# Patient Record
Sex: FEMALE | Race: WHITE | NOT HISPANIC OR LATINO | URBAN - METROPOLITAN AREA
[De-identification: names, ages, dates, MRNs, and addresses within clinical notes are randomized per-mention and may not be internally consistent; named-entity substitution may affect disease eponyms.]

---

## 2023-09-05 ENCOUNTER — OFFICE VISIT (OUTPATIENT)
Dept: URGENT CARE | Facility: CLINIC | Age: 38
End: 2023-09-05
Payer: COMMERCIAL

## 2023-09-05 VITALS
RESPIRATION RATE: 20 BRPM | SYSTOLIC BLOOD PRESSURE: 133 MMHG | HEART RATE: 120 BPM | OXYGEN SATURATION: 100 % | TEMPERATURE: 99 F | DIASTOLIC BLOOD PRESSURE: 87 MMHG

## 2023-09-05 DIAGNOSIS — J02.0 STREP PHARYNGITIS: Primary | ICD-10-CM

## 2023-09-05 PROCEDURE — 99213 OFFICE O/P EST LOW 20 MIN: CPT | Performed by: FAMILY MEDICINE

## 2023-09-05 RX ORDER — AMOXICILLIN 500 MG/1
500 CAPSULE ORAL EVERY 12 HOURS SCHEDULED
Qty: 20 CAPSULE | Refills: 0 | Status: SHIPPED | OUTPATIENT
Start: 2023-09-05 | End: 2023-09-15

## 2023-09-05 RX ORDER — ONDANSETRON 4 MG/1
4 TABLET, ORALLY DISINTEGRATING ORAL ONCE
Status: COMPLETED | OUTPATIENT
Start: 2023-09-05 | End: 2023-09-05

## 2023-09-05 RX ORDER — METHYLPREDNISOLONE 4 MG/1
TABLET ORAL
Qty: 21 TABLET | Refills: 0 | Status: SHIPPED | OUTPATIENT
Start: 2023-09-05

## 2023-09-05 RX ORDER — ONDANSETRON 4 MG/1
4 TABLET, FILM COATED ORAL EVERY 8 HOURS PRN
Qty: 20 TABLET | Refills: 0 | Status: SHIPPED | OUTPATIENT
Start: 2023-09-05

## 2023-09-05 RX ADMIN — ONDANSETRON 4 MG: 4 TABLET, ORALLY DISINTEGRATING ORAL at 09:00

## 2023-09-05 NOTE — PROGRESS NOTES
[facility]        NAME: Emil Paulson is a 45 y.o. female  : 1985    MRN: [id number]  DATE: 2023  TIME: 9:11 AM    Assessment and Plan   Strep pharyngitis [J02.0]  1. Strep pharyngitis  ondansetron (ZOFRAN-ODT) dispersible tablet 4 mg    amoxicillin (AMOXIL) 500 mg capsule    methylPREDNISolone 4 MG tablet therapy pack    ondansetron (ZOFRAN) 4 mg tablet            Patient Instructions       Follow up with PCP in 3-5 days. Proceed to  ER if symptoms worsen. Chief Complaint     Chief Complaint   Patient presents with   • Vomiting     Patient has had a sore throat starting on  night, fever, nausea, and vomiting starting last night into this morning          History of Present Illness       26-year-old female with 3-day history of sore throat, dizziness and headaches. She also reports beginning with vomiting this morning. Review of Systems   Review of Systems   Constitutional: Positive for fever. HENT: Positive for congestion and sore throat. Eyes: Negative. Respiratory: Negative. Cardiovascular: Negative. Gastrointestinal: Positive for vomiting. Genitourinary: Negative. Musculoskeletal: Positive for myalgias. Skin: Negative. Allergic/Immunologic: Negative. Neurological: Negative. Hematological: Negative. Psychiatric/Behavioral: Negative.           Current Medications       Current Outpatient Medications:   •  amoxicillin (AMOXIL) 500 mg capsule, Take 1 capsule (500 mg total) by mouth every 12 (twelve) hours for 10 days, Disp: 20 capsule, Rfl: 0  •  methylPREDNISolone 4 MG tablet therapy pack, Use as directed on package, Disp: 21 tablet, Rfl: 0  •  ondansetron (ZOFRAN) 4 mg tablet, Take 1 tablet (4 mg total) by mouth every 8 (eight) hours as needed for nausea or vomiting, Disp: 20 tablet, Rfl: 0    Current Facility-Administered Medications:   •  ondansetron (ZOFRAN-ODT) dispersible tablet 4 mg, 4 mg, Oral, Once, DO Cha Xiao Allergies     Allergies as of 09/05/2023 - Reviewed 09/05/2023   Allergen Reaction Noted   • Erythromycin Rash 09/05/2023            The following portions of the patient's history were reviewed and updated as appropriate: allergies, current medications, past family history, past medical history, past social history, past surgical history and problem list.     No past medical history on file. No past surgical history on file. No family history on file. Medications have been verified. Objective   /87   Pulse (!) 120   Temp 99 °F (37.2 °C)   Resp 20   SpO2 100%   No LMP recorded. Physical Exam     Physical Exam  Vitals and nursing note reviewed. Constitutional:       Appearance: She is well-developed. HENT:      Head: Normocephalic. Mouth/Throat:      Pharynx: Oropharyngeal exudate and posterior oropharyngeal erythema present. Eyes:      Pupils: Pupils are equal, round, and reactive to light. Cardiovascular:      Rate and Rhythm: Regular rhythm. Tachycardia present. Pulmonary:      Effort: Pulmonary effort is normal.   Abdominal:      General: Abdomen is flat. Musculoskeletal:         General: Normal range of motion. Skin:     General: Skin is warm and dry. Neurological:      Mental Status: She is alert and oriented to person, place, and time.

## 2023-09-30 ENCOUNTER — OFFICE VISIT (OUTPATIENT)
Dept: URGENT CARE | Facility: CLINIC | Age: 38
End: 2023-09-30
Payer: COMMERCIAL

## 2023-09-30 VITALS
RESPIRATION RATE: 18 BRPM | DIASTOLIC BLOOD PRESSURE: 87 MMHG | SYSTOLIC BLOOD PRESSURE: 129 MMHG | TEMPERATURE: 98 F | HEART RATE: 94 BPM | OXYGEN SATURATION: 97 %

## 2023-09-30 DIAGNOSIS — J02.9 SORE THROAT: ICD-10-CM

## 2023-09-30 DIAGNOSIS — J02.0 STREP PHARYNGITIS: Primary | ICD-10-CM

## 2023-09-30 LAB — S PYO AG THROAT QL: POSITIVE

## 2023-09-30 PROCEDURE — 99213 OFFICE O/P EST LOW 20 MIN: CPT

## 2023-09-30 PROCEDURE — 87880 STREP A ASSAY W/OPTIC: CPT

## 2023-09-30 RX ORDER — PENICILLIN V POTASSIUM 500 MG/1
500 TABLET ORAL EVERY 12 HOURS
Qty: 20 TABLET | Refills: 0 | Status: SHIPPED | OUTPATIENT
Start: 2023-09-30 | End: 2023-10-10

## 2023-09-30 NOTE — PROGRESS NOTES
[facility]        NAME: Jadon Zamora is a 45 y.o. female  : 1985    MRN: [id number]  DATE: 2023  TIME: 10:12 AM    Assessment and Plan   Strep pharyngitis [J02.0]  1. Strep pharyngitis          - Rapid strep positive     Patient Instructions   - Take antibiotic as directed  - Throw out toothbrush and tooth paste 24-48 hours after stating antibiotic   - Tylenol or Motrin as needed  - Cough drops with menthol or Cepacol lozenges as needed   Follow up with PCP in 3-5 days. Proceed to  ER if symptoms worsen. Chief Complaint     Chief Complaint   Patient presents with    Sore Throat     Pt reports sore throat since yesterday. History of Present Illness       44 y/o F presents for sore throat x 2 days. Pt was dx with strep pharyngitis on . Took course of ABX as directed. Did not change tooth brush. Yesterday sore throat, pain with swallowing, and swollen glands developed. No known sick contacts. Theraflu PRN. Review of Systems   Review of Systems   Constitutional:  Negative for chills and fever. HENT:  Positive for congestion, sore throat and trouble swallowing. Negative for ear pain. Respiratory:  Negative for cough.           Current Medications       Current Outpatient Medications:     methylPREDNISolone 4 MG tablet therapy pack, Use as directed on package (Patient not taking: Reported on 2023), Disp: 21 tablet, Rfl: 0    ondansetron (ZOFRAN) 4 mg tablet, Take 1 tablet (4 mg total) by mouth every 8 (eight) hours as needed for nausea or vomiting (Patient not taking: Reported on 2023), Disp: 20 tablet, Rfl: 0    Current Allergies     Allergies as of 2023 - Reviewed 2023   Allergen Reaction Noted    Erythromycin Rash 2023            The following portions of the patient's history were reviewed and updated as appropriate: allergies, current medications, past family history, past medical history, past social history, past surgical history and problem list.     No past medical history on file. No past surgical history on file. No family history on file. Medications have been verified. Objective   /87   Pulse 94   Temp 98 °F (36.7 °C) (Temporal)   Resp 18   LMP 09/16/2023 (Approximate) Comment: Pt reports LMP was normal.  SpO2 97%   Patient's last menstrual period was 09/16/2023 (approximate). Physical Exam     Physical Exam  Vitals and nursing note reviewed. Constitutional:       General: She is not in acute distress. Appearance: She is not toxic-appearing. HENT:      Head: Normocephalic and atraumatic. Comments: No exudates  1+ L sided tonsil swelling      Right Ear: Tympanic membrane, ear canal and external ear normal.      Left Ear: Tympanic membrane, ear canal and external ear normal.      Nose: Nose normal.      Mouth/Throat:      Mouth: Mucous membranes are moist.      Pharynx: Posterior oropharyngeal erythema present. Eyes:      Conjunctiva/sclera: Conjunctivae normal.   Pulmonary:      Effort: Pulmonary effort is normal.   Lymphadenopathy:      Cervical: Cervical adenopathy present. Neurological:      Mental Status: She is alert.    Psychiatric:         Mood and Affect: Mood normal.         Behavior: Behavior normal.

## 2024-03-27 ENCOUNTER — TELEPHONE (OUTPATIENT)
Age: 39
End: 2024-03-27

## 2024-03-27 NOTE — TELEPHONE ENCOUNTER
Pt called to establish care with OBGYN. Is looking to get in soon but with a female doctor will discuss with  and sched appt after they figure a location and date and doctor that will best work for them.

## 2024-09-06 ENCOUNTER — OFFICE VISIT (OUTPATIENT)
Dept: URGENT CARE | Facility: CLINIC | Age: 39
End: 2024-09-06
Payer: COMMERCIAL

## 2024-09-06 VITALS
DIASTOLIC BLOOD PRESSURE: 90 MMHG | RESPIRATION RATE: 18 BRPM | SYSTOLIC BLOOD PRESSURE: 130 MMHG | HEART RATE: 72 BPM | OXYGEN SATURATION: 99 % | TEMPERATURE: 98.7 F

## 2024-09-06 DIAGNOSIS — N30.01 ACUTE CYSTITIS WITH HEMATURIA: Primary | ICD-10-CM

## 2024-09-06 LAB
SL AMB  POCT GLUCOSE, UA: NEGATIVE
SL AMB LEUKOCYTE ESTERASE,UA: ABNORMAL
SL AMB POCT BILIRUBIN,UA: ABNORMAL
SL AMB POCT BLOOD,UA: ABNORMAL
SL AMB POCT CLARITY,UA: ABNORMAL
SL AMB POCT COLOR,UA: YELLOW
SL AMB POCT KETONES,UA: NEGATIVE
SL AMB POCT NITRITE,UA: NEGATIVE
SL AMB POCT PH,UA: 6.5
SL AMB POCT SPECIFIC GRAVITY,UA: 1.01
SL AMB POCT URINE HCG: NEGATIVE
SL AMB POCT URINE PROTEIN: NEGATIVE
SL AMB POCT UROBILINOGEN: 0.2

## 2024-09-06 PROCEDURE — 99213 OFFICE O/P EST LOW 20 MIN: CPT

## 2024-09-06 PROCEDURE — 81025 URINE PREGNANCY TEST: CPT

## 2024-09-06 PROCEDURE — 81002 URINALYSIS NONAUTO W/O SCOPE: CPT

## 2024-09-06 RX ORDER — PHENAZOPYRIDINE HYDROCHLORIDE 200 MG/1
200 TABLET, FILM COATED ORAL 3 TIMES DAILY PRN
Qty: 10 TABLET | Refills: 0 | Status: SHIPPED | OUTPATIENT
Start: 2024-09-06

## 2024-09-06 RX ORDER — SULFAMETHOXAZOLE/TRIMETHOPRIM 800-160 MG
1 TABLET ORAL EVERY 12 HOURS SCHEDULED
Qty: 14 TABLET | Refills: 0 | Status: SHIPPED | OUTPATIENT
Start: 2024-09-06 | End: 2024-09-13

## 2024-09-06 NOTE — PATIENT INSTRUCTIONS
Your urine test in the office today shows signs of a Urinary Tract Infection  Start and complete course of antibiotics that has been sent to the pharmacy.  Your urine has been sent to culture, it will take a few days to grow.  You can monitor for results on MyChart. If you have not already done so, sign up for access to your MyChart.  If the antibiotic that was prescribed is not the optimal treatment based on the culture, you will receive a phone call only if this needs to be changed.  Increase your fluid intake.  For pain, you can take Pyridium which was prescribed but remember that it can turn your urine orange so do not be alarmed with the color change if you take this medication.    Follow up with Primary Care Provider in 3-5 days if not improving.  Proceed to Emergency Department if symptoms worsen.    If tests have been performed at [facility], our office will contact you with results if changes need to be made to the care plan discussed with you at the visit.  You can review your full results on [facility] MyChart.

## 2024-09-06 NOTE — PROGRESS NOTES
[facility]        NAME: Anai Manzo is a 39 y.o. female  : 1985    MRN: [id number]  DATE: 2024  TIME: 2:46 PM    Assessment and Plan   Acute cystitis with hematuria [N30.01]  1. Acute cystitis with hematuria  POCT urine dip    POCT urine HCG    Urine culture    Urine culture    sulfamethoxazole-trimethoprim (BACTRIM DS) 800-160 mg per tablet    phenazopyridine (PYRIDIUM) 200 mg tablet        Urine was dipped prior to bringing patient to room and history reviewed  Large blood, Mod Leuks, preg neg    Patient Instructions   Your urine test in the office today shows signs of a Urinary Tract Infection  Start and complete course of antibiotics that has been sent to the pharmacy.  Your urine has been sent to culture, it will take a few days to grow.  You can monitor for results on MyChart. If you have not already done so, sign up for access to your MyChart.  If the antibiotic that was prescribed is not the optimal treatment based on the culture, you will receive a phone call only if this needs to be changed.  Increase your fluid intake.  For pain, you can take Pyridium which was prescribed but remember that it can turn your urine orange so do not be alarmed with the color change if you take this medication.    Follow up with Primary Care Provider in 3-5 days if not improving.  Proceed to Emergency Department if symptoms worsen.    If tests have been performed at [facility], our office will contact you with results if changes need to be made to the care plan discussed with you at the visit.  You can review [facility].    Chief Complaint     Chief Complaint   Patient presents with    Abdominal Pain     Patient states that she started having lower abdominal discomfort 2 days ago and now its a sharp pain that radiates up her right side. No blood in urine and no burning when urinating.          History of Present Illness       Pelvic discomfort starting about 2 days ago  states this morning the pain became much sharper and feels like it is radiating to her right lower abdomen.  States that she has had history of kidney stones in the past and in those instances the pain only started from up top and went down.  Denies any mid to upper back pain.    Abdominal Pain  Associated symptoms include dysuria and frequency. Pertinent negatives include no arthralgias, diarrhea, fever, nausea or vomiting.       Review of Systems   Review of Systems   Constitutional:  Negative for chills and fever.   Respiratory:  Negative for shortness of breath.    Cardiovascular:  Negative for chest pain and palpitations.   Gastrointestinal:  Positive for abdominal pain. Negative for diarrhea, nausea and vomiting.   Genitourinary:  Positive for dysuria, flank pain, frequency and urgency.   Musculoskeletal:  Negative for arthralgias and back pain.   Skin:  Negative for color change and rash.   Neurological:  Negative for dizziness, seizures, syncope, weakness and numbness.   All other systems reviewed and are negative.        Current Medications       Current Outpatient Medications:     phenazopyridine (PYRIDIUM) 200 mg tablet, Take 1 tablet (200 mg total) by mouth 3 (three) times a day as needed for bladder spasms, Disp: 10 tablet, Rfl: 0    sulfamethoxazole-trimethoprim (BACTRIM DS) 800-160 mg per tablet, Take 1 tablet by mouth every 12 (twelve) hours for 7 days, Disp: 14 tablet, Rfl: 0    methylPREDNISolone 4 MG tablet therapy pack, Use as directed on package (Patient not taking: Reported on 9/30/2023), Disp: 21 tablet, Rfl: 0    ondansetron (ZOFRAN) 4 mg tablet, Take 1 tablet (4 mg total) by mouth every 8 (eight) hours as needed for nausea or vomiting (Patient not taking: Reported on 9/30/2023), Disp: 20 tablet, Rfl: 0    Current Allergies     Allergies as of 09/06/2024 - Reviewed 09/06/2024   Allergen Reaction Noted    Erythromycin Rash 09/05/2023            The following portions of the patient's history  were reviewed and updated as appropriate: allergies, current medications, past family history, past medical history, past social history, past surgical history and problem list.     History reviewed. No pertinent past medical history.    Past Surgical History:   Procedure Laterality Date    HYSTERECTOMY  07/17/2024       History reviewed. No pertinent family history.      Medications have been verified.        Objective   /90   Pulse 72   Temp 98.7 °F (37.1 °C)   Resp 18   SpO2 99%   No LMP recorded.       Physical Exam     Physical Exam  Vitals and nursing note reviewed.   Constitutional:       Appearance: Normal appearance.   HENT:      Head: Normocephalic and atraumatic.   Pulmonary:      Effort: Pulmonary effort is normal.   Abdominal:      Tenderness: There is no right CVA tenderness or left CVA tenderness.       Skin:     General: Skin is warm and dry.      Capillary Refill: Capillary refill takes less than 2 seconds.   Neurological:      General: No focal deficit present.      Mental Status: She is alert and oriented to person, place, and time. Mental status is at baseline.      Sensory: No sensory deficit.      Motor: No weakness.   Psychiatric:         Mood and Affect: Mood normal.         Behavior: Behavior normal.         Thought Content: Thought content normal.

## 2024-09-08 LAB
BACTERIA UR CULT: NORMAL
Lab: NORMAL

## 2024-09-17 ENCOUNTER — OFFICE VISIT (OUTPATIENT)
Dept: URGENT CARE | Facility: CLINIC | Age: 39
End: 2024-09-17
Payer: COMMERCIAL

## 2024-09-17 VITALS
HEIGHT: 71 IN | WEIGHT: 235 LBS | HEART RATE: 77 BPM | DIASTOLIC BLOOD PRESSURE: 88 MMHG | RESPIRATION RATE: 16 BRPM | OXYGEN SATURATION: 98 % | BODY MASS INDEX: 32.9 KG/M2 | TEMPERATURE: 99.9 F | SYSTOLIC BLOOD PRESSURE: 142 MMHG

## 2024-09-17 DIAGNOSIS — R35.0 URINARY FREQUENCY: Primary | ICD-10-CM

## 2024-09-17 LAB
SL AMB  POCT GLUCOSE, UA: NEGATIVE
SL AMB LEUKOCYTE ESTERASE,UA: NEGATIVE
SL AMB POCT BILIRUBIN,UA: NEGATIVE
SL AMB POCT BLOOD,UA: ABNORMAL
SL AMB POCT CLARITY,UA: CLEAR
SL AMB POCT COLOR,UA: YELLOW
SL AMB POCT KETONES,UA: NEGATIVE
SL AMB POCT NITRITE,UA: NEGATIVE
SL AMB POCT PH,UA: 6
SL AMB POCT SPECIFIC GRAVITY,UA: 1.03
SL AMB POCT URINE PROTEIN: NEGATIVE
SL AMB POCT UROBILINOGEN: 0.2

## 2024-09-17 PROCEDURE — 99213 OFFICE O/P EST LOW 20 MIN: CPT | Performed by: PHYSICIAN ASSISTANT

## 2024-09-17 PROCEDURE — 81002 URINALYSIS NONAUTO W/O SCOPE: CPT | Performed by: PHYSICIAN ASSISTANT

## 2024-09-17 NOTE — PROGRESS NOTES
[facility]        NAME: Anai Manzo is a 39 y.o. female  : 1985    MRN: [id number]  DATE: 2024  TIME: 5:52 PM    Assessment and Plan   Urinary frequency [R35.0]  1. Urinary frequency  POCT urine dip    Urine culture            Patient Instructions       Follow up with PCP in 3-5 days.  Proceed to  ER if symptoms worsen.    If tests have been performed at [facility], our office will contact you with results if changes need to be made to the care plan discussed with you at the visit.  You can review your full results on [facility] MyChart.    Chief Complaint     Chief Complaint   Patient presents with    Possible UTI     Pt reports pelvic pain and frequency with onset of symptoms yesterday. States treated 10 days ago for UTI and treated with Bactrim with relief while treating.         History of Present Illness       Patient presents with pain and discomfort in suprapubic area, back pains worse on the right side, and urinary frequency.  Denies dysuria, fevers, vaginal bleeding/discharge.         Review of Systems   Review of Systems   Constitutional:  Negative for fatigue and fever.   Gastrointestinal:  Positive for abdominal pain.   Genitourinary:  Positive for frequency. Negative for dysuria, flank pain, hematuria, urgency, vaginal bleeding and vaginal discharge.   Musculoskeletal:  Negative for back pain.         Current Medications       Current Outpatient Medications:     methylPREDNISolone 4 MG tablet therapy pack, Use as directed on package (Patient not taking: Reported on 2023), Disp: 21 tablet, Rfl: 0    ondansetron (ZOFRAN) 4 mg tablet, Take 1 tablet (4 mg total) by mouth every 8 (eight) hours as needed for nausea or vomiting (Patient not taking: Reported on 2023), Disp: 20 tablet, Rfl: 0    phenazopyridine (PYRIDIUM) 200 mg tablet, Take 1 tablet (200 mg total) by mouth 3 (three) times a day as needed for bladder spasms (Patient not taking: Reported on 2024), Disp:  "10 tablet, Rfl: 0    Current Allergies     Allergies as of 09/17/2024 - Reviewed 09/06/2024   Allergen Reaction Noted    Erythromycin Rash 09/05/2023            The following portions of the patient's history were reviewed and updated as appropriate: allergies, current medications, past family history, past medical history, past social history, past surgical history and problem list.     No past medical history on file.    Past Surgical History:   Procedure Laterality Date    HYSTERECTOMY  07/17/2024       No family history on file.      Medications have been verified.        Objective   /88 (BP Location: Left arm, Patient Position: Sitting)   Pulse 77   Temp 99.9 °F (37.7 °C)   Resp 16   Ht 5' 11\" (1.803 m)   Wt 107 kg (235 lb)   SpO2 98%   BMI 32.78 kg/m²   No LMP recorded.       Physical Exam     Physical Exam  Constitutional:       General: She is not in acute distress.     Appearance: Normal appearance. She is not ill-appearing.   Abdominal:      General: Abdomen is flat. Bowel sounds are normal.      Palpations: Abdomen is soft.      Tenderness: There is no abdominal tenderness. There is no right CVA tenderness, left CVA tenderness, guarding or rebound.   Neurological:      Mental Status: She is alert.   Psychiatric:         Mood and Affect: Mood normal.         Behavior: Behavior normal.                   "

## 2024-09-17 NOTE — PATIENT INSTRUCTIONS
Follow-up with your primary care provider or OBGYN in the next 3-5 days.  Any new or worsening symptoms develop get re-evaluated sooner or proceed to the ER.  Urine culture results will be available in a few days.   
90.7

## 2024-09-20 LAB
BACTERIA UR CULT: NORMAL
Lab: NORMAL

## 2024-10-15 ENCOUNTER — OFFICE VISIT (OUTPATIENT)
Dept: FAMILY MEDICINE CLINIC | Facility: CLINIC | Age: 39
End: 2024-10-15
Payer: COMMERCIAL

## 2024-10-15 VITALS
DIASTOLIC BLOOD PRESSURE: 78 MMHG | BODY MASS INDEX: 35.22 KG/M2 | WEIGHT: 237.8 LBS | SYSTOLIC BLOOD PRESSURE: 122 MMHG | OXYGEN SATURATION: 98 % | TEMPERATURE: 99.2 F | HEART RATE: 100 BPM | RESPIRATION RATE: 16 BRPM | HEIGHT: 69 IN

## 2024-10-15 DIAGNOSIS — Z11.59 NEED FOR HEPATITIS C SCREENING TEST: ICD-10-CM

## 2024-10-15 DIAGNOSIS — N20.0 KIDNEY STONE: ICD-10-CM

## 2024-10-15 DIAGNOSIS — Z13.1 SCREENING FOR DIABETES MELLITUS: ICD-10-CM

## 2024-10-15 DIAGNOSIS — Z80.8 FAMILY HISTORY OF SKIN CANCER: ICD-10-CM

## 2024-10-15 DIAGNOSIS — Z82.49 FAMILY HISTORY OF HYPERTROPHIC CARDIOMYOPATHY: ICD-10-CM

## 2024-10-15 DIAGNOSIS — G89.29 CHRONIC PAIN OF LEFT KNEE: Primary | ICD-10-CM

## 2024-10-15 DIAGNOSIS — D50.0 IRON DEFICIENCY ANEMIA DUE TO CHRONIC BLOOD LOSS: ICD-10-CM

## 2024-10-15 DIAGNOSIS — R53.82 CHRONIC FATIGUE: ICD-10-CM

## 2024-10-15 DIAGNOSIS — M25.562 CHRONIC PAIN OF LEFT KNEE: Primary | ICD-10-CM

## 2024-10-15 DIAGNOSIS — Z13.220 SCREENING FOR LIPID DISORDERS: ICD-10-CM

## 2024-10-15 DIAGNOSIS — Z11.4 SCREENING FOR HIV (HUMAN IMMUNODEFICIENCY VIRUS): ICD-10-CM

## 2024-10-15 PROBLEM — J02.0 STREP PHARYNGITIS: Status: RESOLVED | Noted: 2023-09-05 | Resolved: 2024-10-15

## 2024-10-15 PROBLEM — Z90.710 S/P HYSTERECTOMY: Status: ACTIVE | Noted: 2024-10-15

## 2024-10-15 PROCEDURE — 99204 OFFICE O/P NEW MOD 45 MIN: CPT | Performed by: FAMILY MEDICINE

## 2024-10-15 NOTE — PROGRESS NOTES
Anai Manzo 1985 female MRN: [id number]    Family Medicine New Patient    ASSESSMENT/PLAN  Problem List Items Addressed This Visit          Blood    Iron deficiency anemia due to chronic blood loss     Presumed due to blood loss from periods  She is now s/p hysterectomy  Will recheck lab work         Relevant Orders    CBC and differential    Iron       Surgery/Wound/Pain    Chronic pain of left knee - Primary    Relevant Orders    Ambulatory Referral to Orthopedic Surgery       Other    Chronic fatigue    Relevant Orders    Comprehensive metabolic panel    CBC and differential    TSH, 3rd generation with Free T4 reflex    Echo complete w/ contrast if indicated    Family history of hypertrophic cardiomyopathy    Relevant Orders    Echo complete w/ contrast if indicated     Other Visit Diagnoses       Screening for HIV (human immunodeficiency virus)        Relevant Orders    HIV 1/2 Antigen/Antibody (Fourth Generation) with Reflex Testing ([facility], [facility], or EXTERNAL LAB)    Need for hepatitis C screening test        Relevant Orders    Hepatitis C Antibody    Screening for lipid disorders        Relevant Orders    Lipid panel    Screening for diabetes mellitus        Relevant Orders    Comprehensive metabolic panel    Kidney stone        Relevant Orders    UA (URINE) with reflex to Scope    Urine culture    Ambulatory Referral to Urology    Family history of skin cancer        Relevant Orders    Ambulatory referral to Dermatology            Follow up CP         No future appointments.       SUBJECTIVE  CC: New Patient Visit (Establish care.)      HPI:  Anai Manzo is a 39 y.o. female who presents for new patient visit   Moved here from [place] 2 years ago  2 kids age 7 and 8    HPI    Review of Systems   Constitutional:  Negative for chills, fatigue and fever.   HENT:  Negative for congestion, postnasal drip, rhinorrhea and sinus pressure.    Eyes:  Negative for photophobia and visual disturbance.   Respiratory:   "Negative for cough and shortness of breath.    Cardiovascular:  Negative for chest pain, palpitations and leg swelling.   Gastrointestinal:  Negative for abdominal pain, constipation, diarrhea, nausea and vomiting.   Genitourinary:  Negative for difficulty urinating and dysuria.   Musculoskeletal:  Positive for arthralgias. Negative for myalgias.   Skin:  Negative for color change and rash.   Neurological:  Negative for dizziness, weakness, light-headedness and headaches.       Historical Information   The patient history was reviewed as follows:    Past Medical History:   Diagnosis Date    Anemia     Kidney stone      Past Surgical History:   Procedure Laterality Date     SECTION  2015 and 2017    FRACTURE SURGERY   & 2008    HYSTERECTOMY  2024     Family History   Problem Relation Age of Onset    Heart disease Mother     Arthritis Mother     Breast cancer Mother     Hypertension Father     Diabetes Father     Arthritis Father     Cancer Father         Melanoma    Heart disease Maternal Grandfather     Diabetes Maternal Grandfather     Arthritis Maternal Grandfather     Arthritis Maternal Grandmother     Stroke Paternal Grandmother       Social History   Social History     Substance and Sexual Activity   Alcohol Use Not Currently    Comment: None     Social History     Substance and Sexual Activity   Drug Use Never     Social History     Tobacco Use   Smoking Status Never   Smokeless Tobacco Never       Medications:   No current outpatient medications on file.  Allergies   Allergen Reactions    Erythromycin Rash       OBJECTIVE    Vitals:   Vitals:    10/15/24 1059   BP: 122/78   BP Location: Left arm   Patient Position: Sitting   Cuff Size: Large   Pulse: 100   Resp: 16   Temp: 99.2 °F (37.3 °C)   TempSrc: Tympanic   SpO2: 98%   Weight: 108 kg (237 lb 12.8 oz)   Height: 5' 9.3\" (1.76 m)           Physical Exam  Constitutional:       Appearance: She is well-developed.   HENT:      Head: " Normocephalic and atraumatic.   Eyes:      Pupils: Pupils are equal, round, and reactive to light.   Cardiovascular:      Rate and Rhythm: Normal rate and regular rhythm.      Heart sounds: Normal heart sounds.   Pulmonary:      Effort: Pulmonary effort is normal. No respiratory distress.      Breath sounds: Normal breath sounds. No wheezing.   Abdominal:      General: Bowel sounds are normal. There is no distension.      Palpations: Abdomen is soft.      Tenderness: There is no abdominal tenderness.   Musculoskeletal:         General: No tenderness. Normal range of motion.      Cervical back: Normal range of motion and neck supple.   Skin:     General: Skin is warm and dry.   Neurological:      Mental Status: She is alert and oriented to person, place, and time.   Psychiatric:         Behavior: Behavior normal.            Labs:        Cherise King DO    10/15/2024

## 2024-11-26 ENCOUNTER — OFFICE VISIT (OUTPATIENT)
Age: 39
End: 2024-11-26
Payer: COMMERCIAL

## 2024-11-26 ENCOUNTER — APPOINTMENT (OUTPATIENT)
Age: 39
End: 2024-11-26
Payer: COMMERCIAL

## 2024-11-26 VITALS — WEIGHT: 244.2 LBS | HEIGHT: 69 IN | BODY MASS INDEX: 36.17 KG/M2

## 2024-11-26 DIAGNOSIS — G89.29 CHRONIC PAIN OF LEFT KNEE: ICD-10-CM

## 2024-11-26 DIAGNOSIS — Z01.89 ENCOUNTER FOR LOWER EXTREMITY COMPARISON IMAGING STUDY: ICD-10-CM

## 2024-11-26 DIAGNOSIS — M17.32 POST-TRAUMATIC OSTEOARTHRITIS OF LEFT KNEE: Primary | ICD-10-CM

## 2024-11-26 DIAGNOSIS — M25.562 LEFT KNEE PAIN, UNSPECIFIED CHRONICITY: ICD-10-CM

## 2024-11-26 DIAGNOSIS — M25.562 CHRONIC PAIN OF LEFT KNEE: ICD-10-CM

## 2024-11-26 PROCEDURE — 73562 X-RAY EXAM OF KNEE 3: CPT

## 2024-11-26 PROCEDURE — 73564 X-RAY EXAM KNEE 4 OR MORE: CPT

## 2024-11-26 PROCEDURE — 99214 OFFICE O/P EST MOD 30 MIN: CPT | Performed by: STUDENT IN AN ORGANIZED HEALTH CARE EDUCATION/TRAINING PROGRAM

## 2024-11-26 NOTE — PROGRESS NOTES
Knee New Office Note    Assessment:     1. Post-traumatic osteoarthritis of left knee    2. Encounter for lower extremity comparison imaging study    3. Chronic pain of left knee        Plan:     Problem List Items Addressed This Visit          Surgery/Wound/Pain    Chronic pain of left knee    Relevant Orders    Ambulatory Referral to Physical Therapy     Other Visit Diagnoses         Post-traumatic osteoarthritis of left knee    -  Primary    Relevant Orders    XR knee 4+ vw left injury    Ambulatory Referral to Physical Therapy      Encounter for lower extremity comparison imaging study        Relevant Orders    XR knee 3 vw right non injury    Ambulatory Referral to Physical Therapy           Findings today are consistent with left knee post-traumatic patellofemoral arthritis. Imaging and prognosis was reviewed with the patient today. Discussed treatment options including continued observation, low impact exercises, bracing, anti-inflammatories, physical therapy, cortisone injection, and visco injections. Referral to physical therapy provided.  She may continue activities as tolerated.  Follow-up after her trip to [facility].    Subjective:     Patient ID: Anai Manzo is a 39 y.o. female.  Chief Complaint:  HPI:  39 y.o. female presents to the office for evaluation of left knee pain.  She was referred to us by her family doctor, Dr. King. She has history of patellar dislocation when she was 17. She then re-injured it after college with another dislocation and hairline fracture to her femur. She was treated conservatively following both injuries and was doing well up until roughly 1 year ago when she slipped while hiking with her kids. She is experiencing feelings of instability and locking to the left knee.  She does experience pain to the superior medial aspect of the patella.  She has tried OTCs and topicals without relief.  She has an upcoming trip with her  to [facility].    Allergy:  Allergies    Allergen Reactions    Erythromycin Rash     Medications:  all current active meds have been reviewed  Past Medical History:  Past Medical History:   Diagnosis Date    Anemia     Kidney stone      Past Surgical History:  Past Surgical History:   Procedure Laterality Date     SECTION  2015 and 2017    FRACTURE SURGERY   &     HYSTERECTOMY  2024     Family History:  Family History   Problem Relation Age of Onset    Heart disease Mother     Arthritis Mother     Breast cancer Mother     Hypertension Father     Diabetes Father     Arthritis Father     Cancer Father         Melanoma    Heart disease Maternal Grandfather     Diabetes Maternal Grandfather     Arthritis Maternal Grandfather     Arthritis Maternal Grandmother     Stroke Paternal Grandmother      Social History:  Social History     Substance and Sexual Activity   Alcohol Use Not Currently    Comment: None     Social History     Substance and Sexual Activity   Drug Use Never     Social History     Tobacco Use   Smoking Status Never   Smokeless Tobacco Never         ROS:  General: Per HPI  Skin: Negative, except if noted below  HEENT: Negative  Respiratory: Negative  Cardiovascular: Negative  Gastrointestinal: Negative  Urinary: Negative  Vascular: Negative  Musculoskeletal: Positive per HPI   Neurologic: Positive per HPI  Endocrine: Negative    Objective:  BP Readings from Last 1 Encounters:   10/15/24 122/78      Wt Readings from Last 1 Encounters:   24 111 kg (244 lb 3.2 oz)        Respiratory:   non-labored respirations    Lymphatics:  no palpable lymph nodes    Gait:   Normal    Neurologic:   Alert and oriented times 3  Patient with normal sensation except as noted below  Deep tendon reflexes 2+ except as noted in MSK exam    Bilateral Lower Extremity:  Left Knee:      Inspection:  skin intact    Overall limb alignment neutral    Effusion: none    ROM 0-120 with pain and crepitus of PFJ    Extensor Lag: none    Palpation:  "patellar tenderness to palpation    AP Stability at 90 deg stable    M/L stability in full extension stable    M/L stability in midflexion stable    Motor: 5/5 Q/HS/TA/GS/P    Pulses: 2+ DP / 2+ PT    SILT DP/SP/S/S/TN    Imaging:  My interpretation XR AP scanogram/AP bilateral knee/lateral/fuentes/sunrise left knee: mild patellofemoral compartment joint space narrowing, subchondral sclerosis, subchondral cysts, osteophyte formation. No fracture or dislocation.     BMI:   Estimated body mass index is 36.06 kg/m² as calculated from the following:    Height as of this encounter: 5' 9\" (1.753 m).    Weight as of this encounter: 111 kg (244 lb 3.2 oz).  BSA:   Estimated body surface area is 2.25 meters squared as calculated from the following:    Height as of this encounter: 5' 9\" (1.753 m).    Weight as of this encounter: 111 kg (244 lb 3.2 oz).           Scribe Attestation      I,:  Portia Everett am acting as a scribe while in the presence of the attending physician.:       I,:  Sergio Bassett, DO personally performed the services described in this documentation    as scribed in my presence.:              "

## 2024-11-26 NOTE — PATIENT INSTRUCTIONS
Patient Education     Chondromalacia Patella Exercises   About this topic   Your kneecap is also called the patella. It is shaped like a triangle and protects the front of the knee. The strong muscles on the front of the thigh, called the quadriceps, attach to the kneecap. The kneecap helps these muscles straighten the knee. The bottom surface of the kneecap has a layer of cartilage on it. Cartilage is a smooth tissue. It helps the kneecap glide over a groove in the thigh bone when you straighten your knee. When the cartilage breaks down on the back of the kneecap, it is called chondromalacia patella. You may have pain in the front of the knee and grinding when the knee bends. Exercise may help this problem.  General   Before starting with a program, ask your doctor if you are healthy enough to do these exercises. Your doctor may have you work with a  or physical therapist to make a safe exercise program to meet your needs.  Stretching Exercises   Stretching exercises keep your muscles flexible. They also stop them from getting tight. Start by doing each of these stretches 2 to 3 times. In order for your body to make changes, you will need to hold these stretches for 20 to 30 seconds. Try to do the stretches 2 to 3 times each day. Do all exercises slowly.  Hamstring stretches seated ? Sit up straight on the edge of a chair. Make sure you keep your back straight. Straighten your knee on your left leg. Keep your heel on the floor. Bend forward at the waist towards your foot while keeping your upper back straight. Bend forward until you feel a stretch in the back of your thigh. Repeat on the other leg.  Thigh stretches standing ? Stand close to a wall or chair for balance. Bend one knee up and grab your foot behind you with the hand on the same side. If you have trouble grabbing your foot, you can grab your pants near the ankle. Pull your foot closer to your back while bringing the hip backwards. You should feel  a stretch at the front of your thigh, hip, and knee. You may find this easier if you rest the top of the foot of the bent leg on the arm of a couch. If you have trouble with balance, you can do this stretch by lying on your side and bending the top leg back.  Iliotibial band stretches:  To stretch the left IT band: Stand up with your right leg crossed over the left one. Try to point the toes of the feet towards each other. This is a very awkward position. Lean your upper body towards the right while bending your right knee. You should feel a stretch near the left hip. Hold and repeat.  To stretch the right IT band: Stand up with your left leg crossed over the right one. Try to point the toes of the feet towards each other. This is a very awkward position. Lean your upper body towards the left while bending your left knee. You should feel a stretch near the right hip. Hold and repeat.  Strengthening Exercises   Strengthening exercises keep your muscles firm and strong. Start by repeating each exercise 2 to 3 times. Work up to doing each exercise 10 times. Hold each exercise 3-5 seconds. Try to do the exercises 2 to 3 times each day. Do all exercises slowly.  Straight leg raises lying down ? Lie on your back with one leg straight. Bend your other knee so the foot is flat on the bed. Push the knee of your straight leg into the bed in order to tighten your muscle. Keeping your leg straight, lift the leg up to the level of your other knee. Lower it back down. Repeat with the other leg. You may need help with this exercise until you are strong enough to do it on your own. If so, have a helper give support under your ankle with one hand.  Lower leg lifts with towel ? Lie on your back with your knee slightly bent and foot flat on the bed. Roll up a large towel and put it under your sore knee. Tighten your front thigh muscles and lift the lower leg off the bed until it as straight as possible.  Wall squats ? Stand near a  wall with an exercise ball between your back and the wall. Bend your knees. Return to standing. You can make this exercise harder by bending deeper or squatting with only one leg. If you do not have a ball, you can do this with your back up against the wall.               What will the results be?   Less pain and swelling  Better range of motion  More strength  Less grinding or cracking noises  Easier to walk and do other activities  Helpful tips   Stay active and work out to keep your muscles strong and flexible.  Keep a healthy weight so there is not extra stress on your joints. Eat a healthy diet to keep your muscles healthy.  Be sure you do not hold your breath when exercising. This can raise your blood pressure. If you tend to hold your breath, try counting out loud when exercising. If any exercise bothers you, stop right away.  Always warm up before stretching. Heated muscles stretch much easier than cool muscles. Stretching cool muscles can lead to injury.  Try walking or cycling at an easy pace for a few minutes to warm up your muscles. Do this again after exercising.  Never bounce when doing stretches.  After exercising, it is a good idea to use ice. Place an ice pack or a bag of frozen peas wrapped in a towel over the painful part. Never put ice right on the skin. Do not leave the ice on more than 10 to 15 minutes at a time. Ice after activity may help decrease pain and swelling. Never ice before stretching.  If you have this problem, you may not want to do things like run, lunge, squat, kneel, or jump. You may also want to limit how often you go up and down the steps.  Doing exercises before a meal may be a good way to get into a routine.  Exercise may be slightly uncomfortable, but you should not have sharp pains. If you do get sharp pains, stop what you are doing. If the sharp pains continue, call your doctor.  Last Reviewed Date   2024-05-09  Consumer Information Use and Disclaimer   This generalized  information is a limited summary of diagnosis, treatment, and/or medication information. It is not meant to be comprehensive and should be used as a tool to help the user understand and/or assess potential diagnostic and treatment options. It does NOT include all information about conditions, treatments, medications, side effects, or risks that may apply to a specific patient. It is not intended to be medical advice or a substitute for the medical advice, diagnosis, or treatment of a health care provider based on the health care provider's examination and assessment of a patient’s specific and unique circumstances. Patients must speak with a health care provider for complete information about their health, medical questions, and treatment options, including any risks or benefits regarding use of medications. This information does not endorse any treatments or medications as safe, effective, or approved for treating a specific patient. [employer] and its affiliates disclaim any warranty or liability relating to this information or the use thereof. The use of this information is governed by the Terms of Use, available at https://www.wolterskluwer.com/en/know/clinical-effectiveness-terms   Copyright   Copyright © 2024 [employer] and its affiliates and/or licensors. All rights reserved.

## 2025-04-13 ENCOUNTER — TELEPHONE (OUTPATIENT)
Dept: OTHER | Facility: OTHER | Age: 40
End: 2025-04-13

## 2025-04-13 NOTE — TELEPHONE ENCOUNTER
Patient is calling regarding cancelling an appointment.    Date/Time: 4- @  09:00 am     Patient was rescheduled: YES [] NO [x]    Patient requesting call back to reschedule: YES [x] NO []